# Patient Record
Sex: FEMALE | Race: WHITE | NOT HISPANIC OR LATINO | Employment: OTHER | URBAN - METROPOLITAN AREA
[De-identification: names, ages, dates, MRNs, and addresses within clinical notes are randomized per-mention and may not be internally consistent; named-entity substitution may affect disease eponyms.]

---

## 2017-04-24 ENCOUNTER — HISTORICAL (OUTPATIENT)
Dept: ADMINISTRATIVE | Facility: HOSPITAL | Age: 82
End: 2017-04-24

## 2021-01-19 ENCOUNTER — IMMUNIZATION (OUTPATIENT)
Dept: INTERNAL MEDICINE | Facility: CLINIC | Age: 86
End: 2021-01-19
Payer: MEDICARE

## 2021-01-19 DIAGNOSIS — Z23 NEED FOR VACCINATION: Primary | ICD-10-CM

## 2021-01-19 PROCEDURE — 91300 COVID-19, MRNA, LNP-S, PF, 30 MCG/0.3 ML DOSE VACCINE: CPT | Mod: PBBFAC | Performed by: FAMILY MEDICINE

## 2021-02-09 ENCOUNTER — IMMUNIZATION (OUTPATIENT)
Dept: INTERNAL MEDICINE | Facility: CLINIC | Age: 86
End: 2021-02-09
Payer: MEDICARE

## 2021-02-09 DIAGNOSIS — Z23 NEED FOR VACCINATION: Primary | ICD-10-CM

## 2021-02-09 PROCEDURE — 91300 COVID-19, MRNA, LNP-S, PF, 30 MCG/0.3 ML DOSE VACCINE: CPT | Mod: PBBFAC | Performed by: FAMILY MEDICINE

## 2021-02-09 PROCEDURE — 0002A COVID-19, MRNA, LNP-S, PF, 30 MCG/0.3 ML DOSE VACCINE: CPT | Mod: PBBFAC | Performed by: FAMILY MEDICINE

## 2022-04-30 NOTE — OP NOTE
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    PROCEDURE:  Pars plana vitrectomy with vitreous sampling and injection of Kenalog to the right eye.    ANESTHESIA:  General IV.    ESTIMATED BLOOD LOSS:  Less than 5 ml.    COMPLICATIONS:  None.    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  The patient requires a vitrectomy with vitreous sampling.    PROCEDURE DESCRIPTION:  The patient was taken to the operative theater where she was given an IV for sedation followed by retrobulbar block to the right eye consisting of 5 ml of 50/50 mixture of 2% lidocaine and 0.5% bupivacaine.  This produced good anesthesia and akinesia without complication.  The patient was then prepped and draped in the normal sterile fashion and a lid speculum was applied to the right eye.  A standard three port 25 gauge pars plana vitrectomy was performed with all trocars being placed 3.5 mm from the surgical limbus.  The first 0.3 ml of vitreous was sampled and sent for culture and Gram stain.  A core vitrectomy was continued.  The vitreous was found to be thick and oil-like, and the posterior hyaloid was removed out to the peripheral retina.  An anterior chamber paracentesis was created with a MVR blade and vitrectomy cutter was used to remove cortical material from the anterior chamber.  The aqueous fluid was very oil-like like the vitreous was.  The peripheral retina was examined and no retinal breaks were identified, however, the patient did have several dot hemorrhages in the mid peripheral retina.  There were no signs of retinal lesions or retinal  inflammation.  An intravitreal injection of Kenalog in a concentration of 4 mg in 0.1 ml was given and all instruments were removed from the eye.  All sclerotomies were massaged closed with cotton-tip swabs.  Several drops of TobraDex ophthalmic solution were applied to the eye.  The postoperative intraocular pressure was 15 mmHg.  The lid speculum and eye drape were then removed and the eye was covered with gauze patch and Mckeon shield.  The patient was then transported to the postoperative care unit to recover.    DISCHARGE CONDITION:  Good.    DISPOSITION:  Home with follow-up with Dr. Morris the following day.  This patient tolerated the procedure well.        ______________________________  MD PEPPER Cano/NESTOR  DD:  04/24/2017  Time:  01:59PM  DT:  04/24/2017  Time:  03:55PM  Job #:  [id number]